# Patient Record
Sex: FEMALE | Race: WHITE | NOT HISPANIC OR LATINO | Employment: OTHER | URBAN - NONMETROPOLITAN AREA
[De-identification: names, ages, dates, MRNs, and addresses within clinical notes are randomized per-mention and may not be internally consistent; named-entity substitution may affect disease eponyms.]

---

## 2023-08-31 ENCOUNTER — OFFICE VISIT (OUTPATIENT)
Dept: PRIMARY CARE | Facility: CLINIC | Age: 63
End: 2023-08-31
Payer: MEDICARE

## 2023-08-31 VITALS
HEART RATE: 73 BPM | DIASTOLIC BLOOD PRESSURE: 82 MMHG | BODY MASS INDEX: 41.99 KG/M2 | SYSTOLIC BLOOD PRESSURE: 128 MMHG | HEIGHT: 63 IN | OXYGEN SATURATION: 97 % | WEIGHT: 237 LBS

## 2023-08-31 DIAGNOSIS — E03.9 ACQUIRED HYPOTHYROIDISM: ICD-10-CM

## 2023-08-31 DIAGNOSIS — E66.01 OBESITY, MORBID (MULTI): ICD-10-CM

## 2023-08-31 DIAGNOSIS — F17.210 CIGARETTE SMOKER: ICD-10-CM

## 2023-08-31 DIAGNOSIS — K21.9 GASTROESOPHAGEAL REFLUX DISEASE WITHOUT ESOPHAGITIS: ICD-10-CM

## 2023-08-31 DIAGNOSIS — E01.0 THYROMEGALY: Primary | ICD-10-CM

## 2023-08-31 DIAGNOSIS — R73.9 HYPERGLYCEMIA: ICD-10-CM

## 2023-08-31 DIAGNOSIS — R53.83 OTHER FATIGUE: ICD-10-CM

## 2023-08-31 DIAGNOSIS — Z13.220 LIPID SCREENING: ICD-10-CM

## 2023-08-31 DIAGNOSIS — E03.9 ACQUIRED HYPOTHYROIDISM: Primary | ICD-10-CM

## 2023-08-31 DIAGNOSIS — Z12.31 ENCOUNTER FOR SCREENING MAMMOGRAM FOR MALIGNANT NEOPLASM OF BREAST: ICD-10-CM

## 2023-08-31 DIAGNOSIS — R49.0 HOARSENESS OF VOICE: ICD-10-CM

## 2023-08-31 DIAGNOSIS — G47.33 OBSTRUCTIVE SLEEP APNEA: ICD-10-CM

## 2023-08-31 DIAGNOSIS — M77.11 LATERAL EPICONDYLITIS OF RIGHT ELBOW: ICD-10-CM

## 2023-08-31 PROCEDURE — 1160F RVW MEDS BY RX/DR IN RCRD: CPT | Performed by: INTERNAL MEDICINE

## 2023-08-31 PROCEDURE — 99204 OFFICE O/P NEW MOD 45 MIN: CPT | Performed by: INTERNAL MEDICINE

## 2023-08-31 PROCEDURE — 1159F MED LIST DOCD IN RCRD: CPT | Performed by: INTERNAL MEDICINE

## 2023-08-31 RX ORDER — LEVOTHYROXINE SODIUM 200 UG/1
200 TABLET ORAL
COMMUNITY
End: 2023-08-31 | Stop reason: SDUPTHER

## 2023-08-31 RX ORDER — LEVOTHYROXINE SODIUM 200 UG/1
200 TABLET ORAL
Qty: 30 TABLET | Refills: 5 | Status: SHIPPED | OUTPATIENT
Start: 2023-08-31 | End: 2023-10-16

## 2023-08-31 RX ORDER — NAPROXEN 500 MG/1
500 TABLET ORAL
Qty: 20 TABLET | Refills: 0 | Status: SHIPPED | OUTPATIENT
Start: 2023-08-31 | End: 2023-09-10

## 2023-08-31 RX ORDER — FAMOTIDINE 20 MG/1
20 TABLET, FILM COATED ORAL 2 TIMES DAILY
Qty: 60 TABLET | Refills: 5 | Status: SHIPPED | OUTPATIENT
Start: 2023-08-31 | End: 2023-12-06

## 2023-08-31 ASSESSMENT — PATIENT HEALTH QUESTIONNAIRE - PHQ9
1. LITTLE INTEREST OR PLEASURE IN DOING THINGS: NOT AT ALL
2. FEELING DOWN, DEPRESSED OR HOPELESS: NOT AT ALL
SUM OF ALL RESPONSES TO PHQ9 QUESTIONS 1 AND 2: 0

## 2023-08-31 ASSESSMENT — ENCOUNTER SYMPTOMS
FATIGUE: 1
COUGH: 1
DIARRHEA: 0
WHEEZING: 0
PALPITATIONS: 0
NAUSEA: 0
BACK PAIN: 0
BLOOD IN STOOL: 0
ABDOMINAL PAIN: 0
ARTHRALGIAS: 0
VOMITING: 0
VOICE CHANGE: 1
CHEST TIGHTNESS: 0
SHORTNESS OF BREATH: 0

## 2023-08-31 NOTE — PATIENT INSTRUCTIONS
As we discussed I have sent 3 prescriptions to your pharmacy.  1 is for your thyroid medication and please get it started right away.  We recommend that when you take your thyroid medication that you take it in isolation.  You should not take it with supplements, food, or other medications.  We typically recommend that you wait 1 hour for doing anything else because food and medications or supplements can bind to the thyroid medication and make it more difficult for your body to absorb the drug  I have decided to wait 8 weeks before checking any lab work because it will take 8 weeks for your thyroid medicine to reach steady state.  It is an 8 weeks that I would like for you to go to the lab fasting because we are checking other labs as well.  I will see you shortly after those labs are done.  Fasting means typically 12 hours if you can.  For example if you decided to go to the lab at 8 AM in the morning then you would simply cut off anything with calories after 8 PM the night before.  You can have water and in fact we want you to drink water so that you are not dehydrated when you get to the lab  We will have you sign for records from [place] and we are particularly interested in getting a copy of your colonoscopy as well as your sleep study  The staff will be helping you to get your screening mammogram scheduled  One of the other medications at the pharmacy is for Naprosyn to be taken twice daily with food for the next 10 days.  Naprosyn is an anti-inflammatory and might help with that tendinitis.  Please also wear a Ace wrap around your elbow and try to rest your elbow for a while.  Please remember that if you need something for additional pain relief do not take any other NSAIDs.  Examples of NSAIDs are ibuprofen, Motrin, Advil, Naprosyn, naproxen, or aspirin.  If you take these medications with the Naprosyn you will be overdosing.  Which you can take for pain safely with the Naprosyn is Tylenol or  "acetaminophen  The staff will be helping you to schedule a CT scan of your lungs.  As we discussed it is recommended that people who have been smoking for over 20 years should be screened once a year for lung cancer.  Please understand that I do not suspect you have lung cancer at this time but the screening test is designed to  a lung cancer early because of we find a early we can cure you.  Unfortunately if we find lung cancer late it is very difficult to cure  I would like to help you quit smoking in the future so please be thinking about that and I agree that trying to quit smoking now during a divorce is not feasible  I also sent a prescription to your pharmacy called famotidine and please take this twice a day to help with your acid reflux.  Your acid reflux could be causing your hoarseness and we want to see if it will go away  If your hoarseness does not go away we probably will need to take a look at your vocal cords to make sure you do not have a growth there is something  When you come back in 2 months we will be doing what we call a \"welcome to Medicare wellness visit \".  This is something promoted by Medicare and it usually is covered.  "

## 2023-08-31 NOTE — ASSESSMENT & PLAN NOTE
-Her thyroid does appear to be a little bit generous in size so I am ordering a thyroid ultrasound

## 2023-08-31 NOTE — ASSESSMENT & PLAN NOTE
-We will try to track down the results of her sleep study  -At the time she was diagnosed she declined  CPAP therapy  -She does complain of excessive fatigue  -I gave her a handout that explains sleep apnea and have asked that she read it a couple of time

## 2023-08-31 NOTE — ASSESSMENT & PLAN NOTE
-She states the hoarseness has been present for just a few days and when she returns we will discuss whether it needs to be evaluated if it is persistent  -She has had some acid reflux which could be the culprit and I will be treating her with famotidine

## 2023-08-31 NOTE — ASSESSMENT & PLAN NOTE
-She has classic features of lateral epicondylitis and the fact that she was painting for prolonged periods of time makes sense  -We will try Naprosyn twice a day for the next 10 days and I have suggested she put an Ace wrap on her elbow.

## 2023-08-31 NOTE — PROGRESS NOTES
Subjective   Patient ID: Linda TARIQ is a 63 y.o. female who presents for No chief complaint on file..  HPI  She is here today to get established and explains that she moved back to [place] from [place] in March.  She was seeing primary care down there but she unfortunately ran out of her thyroid medication about a month ago and is back here to get on track.  She also states she is currently going through a divorce and although stressful she feels like she is holding up okay.  We did review her past medical history.  She states that her prior physician had concerns about her thyroid gland and was proposing that she have an ultrasound.  We will get that ordered.  We also discussed getting caught up on cancer screening and she is due for screening mammogram.  Unfortunately she started smoking at age 18 and she would be a candidate for lung cancer screening.  We will order a CT scan of her lung.  She states she just got onto Medicare and when we see her back we will do a welcome to Medicare wellness visit.  She states that in the past she has been advised that her blood sugar has been elevated so we will be checking for that.  She states she was also diagnosed as having obstructive sleep apnea but she was not sure really what that all meant and she did not get on CPAP therapy.  We will try to get a copy of her report so we can go over together and I am giving her handout today that explains sleep apnea and why it needs to be treated.  She also has been having some soreness in her right forearm at the level of the lateral epicondyles.  It is tender to touch.  She states it all began after painting a bedroom and putting lots of hours doing brush strokes.  I explained to her that I believe she is suffering from lateral epicondylitis and I am going to treat her with an anti-inflammatory for 10 days.  She states she also had a colonoscopy about 5 years ago we will try to get that report.  We will see her back to go over all  the results in 2 months because I want her to be on her thyroid medication for 2 months before we check thyroid blood work.  Review of Systems   Constitutional:  Positive for fatigue.   HENT:  Positive for voice change.    Respiratory:  Positive for cough. Negative for chest tightness, shortness of breath and wheezing.    Cardiovascular:  Negative for chest pain, palpitations and leg swelling.   Gastrointestinal:  Negative for abdominal pain, blood in stool, diarrhea, nausea and vomiting.   Musculoskeletal:  Negative for arthralgias and back pain.     Objective   Physical Exam  Vitals and nursing note reviewed.   Constitutional:       General: She is not in acute distress.     Appearance: Normal appearance.   HENT:      Head: Normocephalic and atraumatic.   Eyes:      Conjunctiva/sclera: Conjunctivae normal.   Cardiovascular:      Rate and Rhythm: Normal rate and regular rhythm.      Heart sounds: Normal heart sounds.   Pulmonary:      Effort: No respiratory distress.      Breath sounds: No wheezing.   Abdominal:      Palpations: Abdomen is soft.      Tenderness: There is no abdominal tenderness. There is no guarding.   Musculoskeletal:         General: No swelling. Normal range of motion.   Skin:     General: Skin is warm and dry.   Neurological:      General: No focal deficit present.      Mental Status: She is alert and oriented to person, place, and time.   Psychiatric:         Behavior: Behavior normal.       Assessment/Plan   Problem List Items Addressed This Visit       Thyromegaly - Primary     -Her thyroid does appear to be a little bit generous in size so I am ordering a thyroid ultrasound         Relevant Orders    US thyroid    Cigarette smoker     -We will try to help her quit smoking  -Because she has had 45 years of smoking I am ordering the low dose CT scan screening for her lungs         Relevant Orders    CT lung screening low dose    Lipid screening     -We will be doing a screening lipid  profile         Relevant Orders    Lipid Panel    Other fatigue    Relevant Orders    CBC and Auto Differential    Comprehensive Metabolic Panel    Hoarseness of voice     -She states the hoarseness has been present for just a few days and when she returns we will discuss whether it needs to be evaluated if it is persistent  -She has had some acid reflux which could be the culprit and I will be treating her with famotidine         Gastroesophageal reflux disease without esophagitis     -We will try famotidine 20 mg twice daily to see if this gets her symptoms under control         Relevant Medications    famotidine (Pepcid) 20 mg tablet    Hyperglycemia     -We will be checking a hemoglobin A1c and fasting glucose         Relevant Orders    Hemoglobin A1C    Lateral epicondylitis of right elbow     -She has classic features of lateral epicondylitis and the fact that she was painting for prolonged periods of time makes sense  -We will try Naprosyn twice a day for the next 10 days and I have suggested she put an Ace wrap on her elbow.         Relevant Medications    naproxen (Naprosyn) 500 mg tablet    Obesity, morbid (CMS/HCC)    Obstructive sleep apnea     -We will try to track down the results of her sleep study  -At the time she was diagnosed she declined  CPAP therapy  -She does complain of excessive fatigue  -I gave her a handout that explains sleep apnea and have asked that she read it a couple of time          Other Visit Diagnoses       Acquired hypothyroidism        Relevant Orders    TSH    T4, free               Alciia Wong, DO

## 2023-10-02 ENCOUNTER — LAB (OUTPATIENT)
Dept: LAB | Facility: LAB | Age: 63
End: 2023-10-02
Payer: MEDICARE

## 2023-10-02 DIAGNOSIS — R73.9 HYPERGLYCEMIA: ICD-10-CM

## 2023-10-02 DIAGNOSIS — Z13.220 LIPID SCREENING: ICD-10-CM

## 2023-10-02 DIAGNOSIS — E03.9 ACQUIRED HYPOTHYROIDISM: ICD-10-CM

## 2023-10-02 DIAGNOSIS — R53.83 OTHER FATIGUE: ICD-10-CM

## 2023-10-02 LAB
ALBUMIN SERPL BCP-MCNC: 3.9 G/DL (ref 3.4–5)
ALP SERPL-CCNC: 93 U/L (ref 33–136)
ALT SERPL W P-5'-P-CCNC: 27 U/L (ref 7–45)
ANION GAP SERPL CALC-SCNC: 9 MMOL/L (ref 10–20)
AST SERPL W P-5'-P-CCNC: 14 U/L (ref 9–39)
BASOPHILS # BLD AUTO: 0.05 X10*3/UL (ref 0–0.1)
BASOPHILS NFR BLD AUTO: 0.6 %
BILIRUB SERPL-MCNC: 0.5 MG/DL (ref 0–1.2)
BUN SERPL-MCNC: 11 MG/DL (ref 6–23)
CALCIUM SERPL-MCNC: 9.6 MG/DL (ref 8.6–10.3)
CHLORIDE SERPL-SCNC: 103 MMOL/L (ref 98–107)
CHOLEST SERPL-MCNC: 157 MG/DL (ref 0–199)
CHOLESTEROL/HDL RATIO: 3.7
CO2 SERPL-SCNC: 30 MMOL/L (ref 21–32)
CREAT SERPL-MCNC: 0.9 MG/DL (ref 0.5–1.05)
EOSINOPHIL # BLD AUTO: 0.31 X10*3/UL (ref 0–0.7)
EOSINOPHIL NFR BLD AUTO: 3.9 %
ERYTHROCYTE [DISTWIDTH] IN BLOOD BY AUTOMATED COUNT: 13.7 % (ref 11.5–14.5)
EST. AVERAGE GLUCOSE BLD GHB EST-MCNC: 120 MG/DL
GFR SERPL CREATININE-BSD FRML MDRD: 71 ML/MIN/1.73M*2
GLUCOSE SERPL-MCNC: 101 MG/DL (ref 74–99)
HBA1C MFR BLD: 5.8 %
HCT VFR BLD AUTO: 42.4 % (ref 36–46)
HDLC SERPL-MCNC: 43 MG/DL
HGB BLD-MCNC: 13.7 G/DL (ref 12–16)
IMM GRANULOCYTES # BLD AUTO: 0.02 X10*3/UL (ref 0–0.7)
IMM GRANULOCYTES NFR BLD AUTO: 0.2 % (ref 0–0.9)
LDLC SERPL CALC-MCNC: 87 MG/DL (ref 140–190)
LYMPHOCYTES # BLD AUTO: 2.53 X10*3/UL (ref 1.2–4.8)
LYMPHOCYTES NFR BLD AUTO: 31.5 %
MCH RBC QN AUTO: 32.2 PG (ref 26–34)
MCHC RBC AUTO-ENTMCNC: 32.3 G/DL (ref 32–36)
MCV RBC AUTO: 100 FL (ref 80–100)
MONOCYTES # BLD AUTO: 0.59 X10*3/UL (ref 0.1–1)
MONOCYTES NFR BLD AUTO: 7.3 %
NEUTROPHILS # BLD AUTO: 4.53 X10*3/UL (ref 1.2–7.7)
NEUTROPHILS NFR BLD AUTO: 56.5 %
NON HDL CHOLESTEROL: 114 MG/DL (ref 0–149)
NRBC BLD-RTO: 0 /100 WBCS (ref 0–0)
PLATELET # BLD AUTO: 266 X10*3/UL (ref 150–450)
PMV BLD AUTO: 10.8 FL (ref 7.5–11.5)
POTASSIUM SERPL-SCNC: 4.9 MMOL/L (ref 3.5–5.3)
PROT SERPL-MCNC: 6.1 G/DL (ref 6.4–8.2)
RBC # BLD AUTO: 4.26 X10*6/UL (ref 4–5.2)
SODIUM SERPL-SCNC: 137 MMOL/L (ref 136–145)
T4 FREE SERPL-MCNC: 1.65 NG/DL (ref 0.61–1.12)
TRIGL SERPL-MCNC: 133 MG/DL (ref 0–149)
TSH SERPL-ACNC: 0.22 MIU/L (ref 0.44–3.98)
VLDL: 27 MG/DL (ref 0–40)
WBC # BLD AUTO: 8 X10*3/UL (ref 4.4–11.3)

## 2023-10-02 PROCEDURE — 36415 COLL VENOUS BLD VENIPUNCTURE: CPT

## 2023-10-09 ENCOUNTER — APPOINTMENT (OUTPATIENT)
Dept: RADIOLOGY | Facility: HOSPITAL | Age: 63
End: 2023-10-09
Payer: MEDICARE

## 2023-10-11 ENCOUNTER — HOSPITAL ENCOUNTER (OUTPATIENT)
Dept: RADIOLOGY | Facility: HOSPITAL | Age: 63
Discharge: HOME | End: 2023-10-11
Payer: MEDICARE

## 2023-10-11 DIAGNOSIS — E01.0 IODINE-DEFICIENCY RELATED DIFFUSE (ENDEMIC) GOITER: ICD-10-CM

## 2023-10-11 PROCEDURE — 76536 US EXAM OF HEAD AND NECK: CPT

## 2023-10-11 PROCEDURE — 76536 US EXAM OF HEAD AND NECK: CPT | Performed by: RADIOLOGY

## 2023-10-16 DIAGNOSIS — E03.9 ACQUIRED HYPOTHYROIDISM: Primary | ICD-10-CM

## 2023-10-16 RX ORDER — LEVOTHYROXINE SODIUM 175 UG/1
175 TABLET ORAL DAILY
Qty: 30 TABLET | Refills: 5 | Status: SHIPPED | OUTPATIENT
Start: 2023-10-16 | End: 2024-03-28

## 2023-10-26 ENCOUNTER — ANCILLARY PROCEDURE (OUTPATIENT)
Dept: RADIOLOGY | Facility: CLINIC | Age: 63
End: 2023-10-26
Payer: MEDICARE

## 2023-10-26 ENCOUNTER — OFFICE VISIT (OUTPATIENT)
Dept: PRIMARY CARE | Facility: CLINIC | Age: 63
End: 2023-10-26
Payer: MEDICARE

## 2023-10-26 VITALS
HEART RATE: 76 BPM | HEIGHT: 63 IN | WEIGHT: 230 LBS | BODY MASS INDEX: 40.75 KG/M2 | SYSTOLIC BLOOD PRESSURE: 114 MMHG | DIASTOLIC BLOOD PRESSURE: 74 MMHG

## 2023-10-26 DIAGNOSIS — M77.11 LATERAL EPICONDYLITIS OF RIGHT ELBOW: ICD-10-CM

## 2023-10-26 DIAGNOSIS — Z00.00 WELCOME TO MEDICARE PREVENTIVE VISIT: Primary | ICD-10-CM

## 2023-10-26 DIAGNOSIS — E04.1 THYROID NODULE: ICD-10-CM

## 2023-10-26 DIAGNOSIS — R49.0 HOARSENESS OF VOICE: ICD-10-CM

## 2023-10-26 DIAGNOSIS — R73.9 HYPERGLYCEMIA: ICD-10-CM

## 2023-10-26 DIAGNOSIS — Z78.0 MENOPAUSE: ICD-10-CM

## 2023-10-26 PROBLEM — E01.0 THYROMEGALY: Status: RESOLVED | Noted: 2023-08-31 | Resolved: 2023-10-26

## 2023-10-26 PROCEDURE — 73070 X-RAY EXAM OF ELBOW: CPT | Mod: RT,FY

## 2023-10-26 PROCEDURE — G0009 ADMIN PNEUMOCOCCAL VACCINE: HCPCS | Performed by: INTERNAL MEDICINE

## 2023-10-26 PROCEDURE — 1159F MED LIST DOCD IN RCRD: CPT | Performed by: INTERNAL MEDICINE

## 2023-10-26 PROCEDURE — 73070 X-RAY EXAM OF ELBOW: CPT | Mod: RIGHT SIDE | Performed by: RADIOLOGY

## 2023-10-26 PROCEDURE — 1170F FXNL STATUS ASSESSED: CPT | Performed by: INTERNAL MEDICINE

## 2023-10-26 PROCEDURE — 90677 PCV20 VACCINE IM: CPT | Performed by: INTERNAL MEDICINE

## 2023-10-26 PROCEDURE — 1160F RVW MEDS BY RX/DR IN RCRD: CPT | Performed by: INTERNAL MEDICINE

## 2023-10-26 PROCEDURE — G0439 PPPS, SUBSEQ VISIT: HCPCS | Performed by: INTERNAL MEDICINE

## 2023-10-26 PROCEDURE — 99214 OFFICE O/P EST MOD 30 MIN: CPT | Performed by: INTERNAL MEDICINE

## 2023-10-26 ASSESSMENT — ENCOUNTER SYMPTOMS
DIARRHEA: 0
WHEEZING: 0
ARTHRALGIAS: 0
PALPITATIONS: 0
BACK PAIN: 0
SHORTNESS OF BREATH: 0
ABDOMINAL PAIN: 0
VOMITING: 0
COUGH: 0
FATIGUE: 0
NAUSEA: 0
BLOOD IN STOOL: 0

## 2023-10-26 ASSESSMENT — ACTIVITIES OF DAILY LIVING (ADL)
DOING_HOUSEWORK: INDEPENDENT
GROCERY_SHOPPING: INDEPENDENT
BATHING: INDEPENDENT
TAKING_MEDICATION: INDEPENDENT
MANAGING_FINANCES: INDEPENDENT
DRESSING: INDEPENDENT

## 2023-10-26 ASSESSMENT — PATIENT HEALTH QUESTIONNAIRE - PHQ9
SUM OF ALL RESPONSES TO PHQ9 QUESTIONS 1 AND 2: 0
2. FEELING DOWN, DEPRESSED OR HOPELESS: NOT AT ALL
SUM OF ALL RESPONSES TO PHQ9 QUESTIONS 1 AND 2: 0
2. FEELING DOWN, DEPRESSED OR HOPELESS: NOT AT ALL
1. LITTLE INTEREST OR PLEASURE IN DOING THINGS: NOT AT ALL
1. LITTLE INTEREST OR PLEASURE IN DOING THINGS: NOT AT ALL

## 2023-10-26 NOTE — ASSESSMENT & PLAN NOTE
-We talked about fall prevention  -We discussed providing a copy of her living will and power of  for healthcare

## 2023-10-26 NOTE — PROGRESS NOTES
Subjective   Reason for Visit: Linda Lynn is an 63 y.o. female here for a Medicare Wellness visit.     Past Medical, Surgical, and Family History reviewed and updated in chart.    Reviewed all medications by prescribing practitioner or clinical pharmacist (such as prescriptions, OTCs, herbal therapies and supplements) and documented in the medical record.    HPI  She is here today for follow-up and she just became a Medicare recipient.  We did perform her welcome to Medicare wellness visit today.  We also reviewed her test results and laboratory test results as well.  She states she is under a lot of stress because she is going through a divorce but she states she is not really feeling depressed.  She has had no falls in the last year.  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  She has had no issues with her memory and for the most part her diet remains relatively well-balanced.  She states she was in a weight loss challenge with her daughter and so far she is winning.  She is also very physically active and gets a lot of steps in every day.  We discussed the importance of advanced directives as that she provide a copy for her chart here.  We also reviewed her laboratory test results.  Her fasting glucose was slightly raised at 101 and her hemoglobin A1c is slightly high at 5.8.  She understands with healthy diet and exercise and weight loss she can dramatically reduce her risk of diabetes in the future.  She is also been experiencing hoarseness and we did end up ordering an ultrasound of her thyroid gland.  The radiologist describes seeing a nodule measuring 10 x 8 x 5 mm in the right thyroid gland and labeled as a TR 4.  We also discovered that her thyroid blood work was off so I have issued a referral to endocrinology and have asked that she call the office if she does not hear from us.  I am also going to plug in an ultrasound for 1 year from now just to  "make sure we do not miss anything.  We also discussed doing a bone density just prior to her next follow-up visit.  We also discussed having her see ENT because of this hoarseness.  We talked about how acid reflux or postnasal drip could cause irritation to the vocal cords but because she is a smoker we need to make sure she does not have something more serious.  We also are giving her Prevnar 20 today.  She continues to have a lot of problems with her right elbow and I did diagnosed her as having tennis elbow last time.  We gave her Naprosyn but did not really help.  We are going to do an x-ray and I told her we may have to resort to either physical therapy or referral to orthopedics as they can provide a cortisone injection which can be quite beneficial.  I will try to summarize everything in a problem based format  Patient Care Team:  Alicia Wong, DO as PCP - General (Internal Medicine)     Review of Systems   Constitutional:  Negative for fatigue.   Respiratory:  Negative for cough, shortness of breath and wheezing.    Cardiovascular:  Negative for chest pain, palpitations and leg swelling.   Gastrointestinal:  Negative for abdominal pain, blood in stool, diarrhea, nausea and vomiting.   Musculoskeletal:  Negative for arthralgias and back pain.       Objective   Vitals:  /74 (BP Location: Left arm, Patient Position: Sitting)   Pulse 76   Ht 1.6 m (5' 3\")   Wt 104 kg (230 lb)   BMI 40.74 kg/m²       Physical Exam  Vitals and nursing note reviewed.   Constitutional:       General: She is not in acute distress.     Appearance: Normal appearance.   HENT:      Head: Normocephalic and atraumatic.   Eyes:      Conjunctiva/sclera: Conjunctivae normal.   Cardiovascular:      Rate and Rhythm: Normal rate and regular rhythm.      Heart sounds: Normal heart sounds.   Pulmonary:      Effort: No respiratory distress.      Breath sounds: No wheezing.   Abdominal:      Palpations: Abdomen is soft.      " Tenderness: There is no abdominal tenderness. There is no guarding.   Musculoskeletal:         General: No swelling. Normal range of motion.   Skin:     General: Skin is warm and dry.   Neurological:      General: No focal deficit present.      Mental Status: She is alert and oriented to person, place, and time.   Psychiatric:         Behavior: Behavior normal.       Recent Results (from the past 672 hour(s))   TSH    Collection Time: 10/02/23  8:08 AM   Result Value Ref Range    Thyroid Stimulating Hormone 0.22 (L) 0.44 - 3.98 mIU/L   T4, free    Collection Time: 10/02/23  8:08 AM   Result Value Ref Range    Thyroxine, Free 1.65 (H) 0.61 - 1.12 ng/dL   Lipid Panel    Collection Time: 10/02/23  8:08 AM   Result Value Ref Range    Cholesterol 157 0 - 199 mg/dL    HDL-Cholesterol 43.0 mg/dL    Cholesterol/HDL Ratio 3.7     LDL Calculated 87 (L) 140 - 190 mg/dL    VLDL 27 0 - 40 mg/dL    Triglycerides 133 0 - 149 mg/dL    Non HDL Cholesterol 114 0 - 149 mg/dL   CBC and Auto Differential    Collection Time: 10/02/23  8:08 AM   Result Value Ref Range    WBC 8.0 4.4 - 11.3 x10*3/uL    nRBC 0.0 0.0 - 0.0 /100 WBCs    RBC 4.26 4.00 - 5.20 x10*6/uL    Hemoglobin 13.7 12.0 - 16.0 g/dL    Hematocrit 42.4 36.0 - 46.0 %     80 - 100 fL    MCH 32.2 26.0 - 34.0 pg    MCHC 32.3 32.0 - 36.0 g/dL    RDW 13.7 11.5 - 14.5 %    Platelets 266 150 - 450 x10*3/uL    MPV 10.8 7.5 - 11.5 fL    Neutrophils % 56.5 40.0 - 80.0 %    Immature Granulocytes %, Automated 0.2 0.0 - 0.9 %    Lymphocytes % 31.5 13.0 - 44.0 %    Monocytes % 7.3 2.0 - 10.0 %    Eosinophils % 3.9 0.0 - 6.0 %    Basophils % 0.6 0.0 - 2.0 %    Neutrophils Absolute 4.53 1.20 - 7.70 x10*3/uL    Immature Granulocytes Absolute, Automated 0.02 0.00 - 0.70 x10*3/uL    Lymphocytes Absolute 2.53 1.20 - 4.80 x10*3/uL    Monocytes Absolute 0.59 0.10 - 1.00 x10*3/uL    Eosinophils Absolute 0.31 0.00 - 0.70 x10*3/uL    Basophils Absolute 0.05 0.00 - 0.10 x10*3/uL   Comprehensive  Metabolic Panel    Collection Time: 10/02/23  8:08 AM   Result Value Ref Range    Glucose 101 (H) 74 - 99 mg/dL    Sodium 137 136 - 145 mmol/L    Potassium 4.9 3.5 - 5.3 mmol/L    Chloride 103 98 - 107 mmol/L    Bicarbonate 30 21 - 32 mmol/L    Anion Gap 9 (L) 10 - 20 mmol/L    Urea Nitrogen 11 6 - 23 mg/dL    Creatinine 0.90 0.50 - 1.05 mg/dL    eGFR 71 >60 mL/min/1.73m*2    Calcium 9.6 8.6 - 10.3 mg/dL    Albumin 3.9 3.4 - 5.0 g/dL    Alkaline Phosphatase 93 33 - 136 U/L    Total Protein 6.1 (L) 6.4 - 8.2 g/dL    AST 14 9 - 39 U/L    Bilirubin, Total 0.5 0.0 - 1.2 mg/dL    ALT 27 7 - 45 U/L   Hemoglobin A1C    Collection Time: 10/02/23  8:08 AM   Result Value Ref Range    Hemoglobin A1C 5.8 (H) see below %    Estimated Average Glucose 120 Not Established mg/dL     .    Assessment/Plan   Problem List Items Addressed This Visit       Welcome to Medicare preventive visit - Primary     -We talked about fall prevention  -We discussed providing a copy of her living will and power of  for healthcare         Hoarseness of voice     -We are referring her to ENT for evaluation         Relevant Orders    Referral to ENT    Hyperglycemia     -Hemoglobin A1c was 5.8 and we will continue to monitor         Relevant Orders    Basic Metabolic Panel    Hemoglobin A1C    Lateral epicondylitis of right elbow     -We have tried a round of Naprosyn  -She will go for an x-ray and have agreed to contact her with results         Relevant Orders    XR elbow right 1-2 views    Thyroid nodule     -Her right thyroid lobe has a TR 4 nodule and she also has abnormal thyroid blood work.  -We are referring her to endocrinology and she will call if she does not hear from us  -I did plug-in an ultrasound to be done 1 year from now         Relevant Orders    US thyroid    Menopause    Relevant Orders    XR DEXA bone density

## 2023-10-26 NOTE — ASSESSMENT & PLAN NOTE
-We have tried a round of Naprosyn  -She will go for an x-ray and have agreed to contact her with results

## 2023-10-26 NOTE — ASSESSMENT & PLAN NOTE
-Her right thyroid lobe has a TR 4 nodule and she also has abnormal thyroid blood work.  -We are referring her to endocrinology and she will call if she does not hear from us  -I did plug-in an ultrasound to be done 1 year from now

## 2023-10-26 NOTE — PATIENT INSTRUCTIONS
Please contact me if you do not hear from us regarding your referral to endocrinology  They are going to have you see ear nose and throat because of your persistent hoarseness  As we discussed at some point in the future we can really focus on quitting smoking but we will wait until after the stress of your divorce is over  When you think of it try to provide a copy of your living will and power of  for healthcare  We are giving you Prevnar 20 vaccine today  I am having you go for an x-ray of your right below and have agreed to contact you with results.  Remember that you might need to have physical therapy or seeing orthopedic specialist  If everything goes according to plan we will see you back in 6 months and please remember to get fasting lab work done prior to that visit

## 2023-11-06 DIAGNOSIS — M77.11 LATERAL EPICONDYLITIS OF RIGHT ELBOW: Primary | ICD-10-CM

## 2023-11-09 ENCOUNTER — OFFICE VISIT (OUTPATIENT)
Dept: ORTHOPEDIC SURGERY | Facility: CLINIC | Age: 63
End: 2023-11-09
Payer: MEDICARE

## 2023-11-09 DIAGNOSIS — M77.11 LATERAL EPICONDYLITIS OF RIGHT ELBOW: ICD-10-CM

## 2023-11-09 PROCEDURE — 20606 DRAIN/INJ JOINT/BURSA W/US: CPT | Performed by: NURSE PRACTITIONER

## 2023-11-09 PROCEDURE — 1160F RVW MEDS BY RX/DR IN RCRD: CPT | Performed by: NURSE PRACTITIONER

## 2023-11-09 PROCEDURE — L3908 WHO COCK-UP NONMOLDE PRE OTS: HCPCS | Performed by: NURSE PRACTITIONER

## 2023-11-09 PROCEDURE — 1159F MED LIST DOCD IN RCRD: CPT | Performed by: NURSE PRACTITIONER

## 2023-11-09 PROCEDURE — 99204 OFFICE O/P NEW MOD 45 MIN: CPT | Performed by: NURSE PRACTITIONER

## 2023-11-09 PROCEDURE — 1125F AMNT PAIN NOTED PAIN PRSNT: CPT | Performed by: NURSE PRACTITIONER

## 2023-11-09 ASSESSMENT — ENCOUNTER SYMPTOMS
CARDIOVASCULAR NEGATIVE: 1
NEUROLOGICAL NEGATIVE: 1
ENDOCRINE NEGATIVE: 1
HEMATOLOGIC/LYMPHATIC NEGATIVE: 1
ARTHRALGIAS: 1
CONSTITUTIONAL NEGATIVE: 1
PSYCHIATRIC NEGATIVE: 1
RESPIRATORY NEGATIVE: 1

## 2023-11-09 ASSESSMENT — PAIN SCALES - GENERAL: PAINLEVEL_OUTOF10: 8

## 2023-11-09 ASSESSMENT — PAIN - FUNCTIONAL ASSESSMENT: PAIN_FUNCTIONAL_ASSESSMENT: 0-10

## 2023-11-09 NOTE — ASSESSMENT & PLAN NOTE
Sx control with OTC NSAIDs per package directions, take with food, Tylenol prn.   Tennis elbow bracing with repetitive or aggravating activities, may remove with rest and sleep.  Also recommended Velcro wrist brace to limit  Lifting restriction recommended: No greater than 5 pounds for 1 week.  Follow up here 4 weeks, sooner for changes or concerns.    Patient tolerated injection today with positive lidocaine suppression at time of DC.  I offered work note, patient will speak with her employer and see if she can modify work to avoid aggravation.  Patient does have the month of December requested off.  This note was generated using Dragon software.  It may contain errors in wording, punctuation or spelling.

## 2023-11-09 NOTE — PROGRESS NOTES
Subjective    Patient ID: Linda Lynn is a 63 y.o. female.    Chief Complaint: Pain of the Right Elbow (Patient states she has had this pain for about 3 months)     HPI  Linda is a pleasant 63-year-old female presenting today for evaluation of right elbow pain.  Patient has been having pain for approximately 3 months after painting a room.  Tried antiinflammaotires, OTC Icy Hot and pain patches, ice, heat. Ace at night, OTC wrap prn.h  Sx slightly better with rest.   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 Sx started after painting rooms.  RH dominant    Review of Systems   Constitutional: Negative.    HENT: Negative.     Respiratory: Negative.     Cardiovascular: Negative.    Endocrine: Negative.    Musculoskeletal:  Positive for arthralgias.   Skin: Negative.    Neurological: Negative.    Hematological: Negative.    Psychiatric/Behavioral: Negative.        Objective   Right Elbow Exam     Tenderness   The patient is experiencing tenderness in the lateral epicondyle.     Range of Motion   Extension:  10   Flexion:  90     Other   Erythema: absent  Sensation: normal  Pulse: present    Comments:  There is mild swelling over the lateral epicondyles, negative Tinel's over the cubital tunnel.  Full ROM of distal joints with no sx aggravation, distal motor and sensory intact, cap refill at 2 seconds.          Image Results:  XR elbow right 1-2 views  Narrative: Interpreted By:  Cassy Coley,   STUDY:  Right elbow, 2 views.      INDICATION:  Signs/Symptoms:Persistent pain in the elbow region.      COMPARISON:  None.      ACCESSION NUMBER(S):  [id number]      ORDERING CLINICIAN:  PAM HOPE      FINDINGS:  No acute fracture or malalignment.  No elbow joint effusion or abnormal fat pad elevation.  No significant degenerative changes.  Soft tissues are unremarkable.      Impression: 1. Unremarkable radiographic evaluation of the right elbow.      MACRO:  None.      Signed by:  Cassy Coley 10/28/2023 9:00 AM  Dictation workstation:   [id number]  Reviewed x-rays during today's visit with patient, agree with radiology interpretation.      M Inj/Asp: R elbow on 11/9/2023 8:23 AM  Indications: pain and joint swelling  Details: 25 G needle, ultrasound-guided lateral approach  Medications: 4 mg dexAMETHasone 4 mg/mL  Outcome: tolerated well, no immediate complications    We discussed risk and benefits of cortisone injection, patient wishes to proceed via verbal consent.  Skin was prepped with Betadine, Vapocoolant spray and alcohol.  Direct visualization using high-frequency linear probe of ultrasound was utilized to administer the injection of 4mg Dexamethasone, 1cc Lidocaine.  Patient tolerated injection well lidocaine suppression.  Images were saved under MRN number into the PACS system.      Consent was given by the patient. Immediately prior to procedure a time out was called to verify the correct patient, procedure, equipment, support staff and site/side marked as required. Patient was prepped and draped in the usual sterile fashion.         Assessment/Plan   Encounter Diagnoses:  Lateral epicondylitis of right elbow  Problem List Items Addressed This Visit             ICD-10-CM    Lateral epicondylitis of right elbow M77.11     Sx control with OTC NSAIDs per package directions, take with food, Tylenol prn.   Tennis elbow bracing with repetitive or aggravating activities, may remove with rest and sleep.  Also recommended Velcro wrist brace to limit  Lifting restriction recommended: No greater than 5 pounds for 1 week.  Follow up here 4 weeks, sooner for changes or concerns.    Patient tolerated injection today with positive lidocaine suppression at time of DC.  I offered work note, patient will speak with her employer and see if she can modify work to avoid aggravation.  Patient does have the month of December requested off.  This note was generated using Dragon software.  It may  contain errors in wording, punctuation or spelling.         Relevant Orders    Follow Up In Orthopaedic Surgery    Point of Care Ultrasound (Completed)

## 2023-11-14 DIAGNOSIS — M77.11 LATERAL EPICONDYLITIS OF RIGHT ELBOW: Primary | ICD-10-CM

## 2023-11-14 NOTE — PROGRESS NOTES
Encounter Diagnosis   Name Primary?    Lateral epicondylitis of right elbow Yes    Lateral epicondylitis of right elbow  No use R arm until follow up appt. Off work or accommodations to avoid use of R arm.

## 2023-12-06 ENCOUNTER — OFFICE VISIT (OUTPATIENT)
Dept: ORTHOPEDIC SURGERY | Facility: CLINIC | Age: 63
End: 2023-12-06
Payer: MEDICARE

## 2023-12-06 DIAGNOSIS — M77.11 LATERAL EPICONDYLITIS OF RIGHT ELBOW: ICD-10-CM

## 2023-12-06 PROCEDURE — 1160F RVW MEDS BY RX/DR IN RCRD: CPT | Performed by: NURSE PRACTITIONER

## 2023-12-06 PROCEDURE — 1125F AMNT PAIN NOTED PAIN PRSNT: CPT | Performed by: NURSE PRACTITIONER

## 2023-12-06 PROCEDURE — 99213 OFFICE O/P EST LOW 20 MIN: CPT | Performed by: NURSE PRACTITIONER

## 2023-12-06 PROCEDURE — 1159F MED LIST DOCD IN RCRD: CPT | Performed by: NURSE PRACTITIONER

## 2023-12-06 RX ORDER — FLUTICASONE PROPIONATE 50 MCG
2 SPRAY, SUSPENSION (ML) NASAL
COMMUNITY
Start: 2023-11-13 | End: 2024-11-12

## 2023-12-06 RX ORDER — DICLOFENAC SODIUM 10 MG/G
2 GEL TOPICAL 4 TIMES DAILY PRN
Qty: 100 G | Refills: 1 | Status: SHIPPED | OUTPATIENT
Start: 2023-12-06

## 2023-12-06 RX ORDER — FAMOTIDINE 40 MG/1
40 TABLET, FILM COATED ORAL DAILY
COMMUNITY
Start: 2023-12-04

## 2023-12-06 ASSESSMENT — ENCOUNTER SYMPTOMS
ENDOCRINE NEGATIVE: 1
CONSTITUTIONAL NEGATIVE: 1
CARDIOVASCULAR NEGATIVE: 1
RESPIRATORY NEGATIVE: 1
PSYCHIATRIC NEGATIVE: 1
HEMATOLOGIC/LYMPHATIC NEGATIVE: 1
ARTHRALGIAS: 1
NEUROLOGICAL NEGATIVE: 1

## 2023-12-06 ASSESSMENT — PAIN DESCRIPTION - DESCRIPTORS: DESCRIPTORS: DULL;ACHING

## 2023-12-06 ASSESSMENT — PAIN SCALES - GENERAL: PAINLEVEL_OUTOF10: 6

## 2023-12-06 ASSESSMENT — PAIN - FUNCTIONAL ASSESSMENT: PAIN_FUNCTIONAL_ASSESSMENT: 0-10

## 2023-12-06 NOTE — PROGRESS NOTES
Subjective    Patient ID: Linda Lynn is a 63 y.o. female.    Chief Complaint: Follow-up and Pain of the Right Elbow (Patient states she still has pain in her elbow, she does wear the wrist brace as directed.)     YOMAIRA Mckeon is a pleasant 63-year-old female presenting today for FUV of right elbow pain, and lateral epicondylitis.    Sx slightly better with rest. Steroid injection helped, wearing wrist brace helps.  Symptoms are aggravated with heavy lifting and reaching activities.  Did not obtain tennis elbow strap yet.  Quit her job as unable to accommodate time off to allow rest  Ibuprofen helps  RH dominant      Review of Systems   Constitutional: Negative.    HENT: Negative.     Respiratory: Negative.     Cardiovascular: Negative.    Endocrine: Negative.    Musculoskeletal:  Positive for arthralgias.   Skin: Negative.    Neurological: Negative.    Hematological: Negative.    Psychiatric/Behavioral: Negative.        Objective   Right Elbow Exam     Tenderness   The patient is experiencing tenderness in the lateral epicondyle.     Range of Motion   Extension:  10   Flexion:  90     Other   Erythema: absent  Sensation: normal  Pulse: present    Comments:  There is mild swelling over the lateral epicondyles, negative Tinel's over the cubital tunnel.  Full ROM of distal joints with no sx aggravation, distal motor and sensory intact, cap refill at 2 seconds.        Image Results:  XR elbow right 1-2 views  Narrative: Interpreted By:  Cassy Coley,   STUDY:  Right elbow, 2 views.      INDICATION:  Signs/Symptoms:Persistent pain in the elbow region.      COMPARISON:  None.      ACCESSION NUMBER(S):  [id number]      ORDERING CLINICIAN:  PAM HOPE      FINDINGS:  No acute fracture or malalignment.  No elbow joint effusion or abnormal fat pad elevation.  No significant degenerative changes.  Soft tissues are unremarkable.      Impression: 1. Unremarkable radiographic evaluation of the right elbow.       MACRO:  None.      Signed by: Cassy Coley 10/28/2023 9:00 AM  Dictation workstation:   [id number]  Reviewed x-rays during today's visit with patient, agree with radiology interpretation.      Procedures  Assessment/Plan   Encounter Diagnoses:  Lateral epicondylitis of right elbow  Problem List Items Addressed This Visit             ICD-10-CM    Lateral epicondylitis of right elbow M77.11     Problem List Items Addressed This Visit             ICD-10-CM    Lateral epicondylitis of right elbow M77.11       Sx control with OTC NSAIDs per package directions, take with food, Tylenol prn. Recommended topical diclofenac gel 2 g 4 times daily and massage into the area over the next several days, may back down to as needed basis according to symptoms.  Tennis elbow bracing recommended with repetitive or aggravating activities, may remove with rest and sleep.  Also recommended Velcro wrist brace to be used with the tennis elbow strapping for symptom flares.  May transition to just a tennis elbow strap.  Lifting restriction recommended: Patient encouraged to keep activities and waist level, keep weight light and gradually increase as tolerated.  I am recommending OT for additional modalities for symptom control.  Follow up here 4 weeks, sooner for changes or concerns.    This note was generated using Dragon software.  It may contain errors in wording, punctuation or spelling.          Relevant Medications    diclofenac sodium (Voltaren) 1 % gel gel    Other Relevant Orders    Referral to Occupational Therapy    Follow Up In Orthopaedic Surgery

## 2023-12-06 NOTE — ASSESSMENT & PLAN NOTE
Sx control with OTC NSAIDs per package directions, take with food, Tylenol prn. Recommended topical diclofenac gel 2 g 4 times daily and massage into the area over the next several days, may back down to as needed basis according to symptoms.  Tennis elbow bracing recommended with repetitive or aggravating activities, may remove with rest and sleep.  Also recommended Velcro wrist brace to be used with the tennis elbow strapping for symptom flares.  May transition to just a tennis elbow strap.  Lifting restriction recommended: Patient encouraged to keep activities and waist level, keep weight light and gradually increase as tolerated.  I am recommending OT for additional modalities for symptom control.  Follow up here 4 weeks, sooner for changes or concerns.    This note was generated using Dragon software.  It may contain errors in wording, punctuation or spelling.

## 2024-01-05 ENCOUNTER — APPOINTMENT (OUTPATIENT)
Dept: ORTHOPEDIC SURGERY | Facility: CLINIC | Age: 64
End: 2024-01-05
Payer: MEDICARE

## 2024-03-28 DIAGNOSIS — E03.9 ACQUIRED HYPOTHYROIDISM: ICD-10-CM

## 2024-03-28 RX ORDER — LEVOTHYROXINE SODIUM 175 UG/1
175 TABLET ORAL DAILY
Qty: 90 TABLET | Refills: 0 | Status: SHIPPED | OUTPATIENT
Start: 2024-03-28 | End: 2024-04-25

## 2024-04-15 ENCOUNTER — LAB (OUTPATIENT)
Dept: LAB | Facility: LAB | Age: 64
End: 2024-04-15
Payer: MEDICARE

## 2024-04-15 DIAGNOSIS — E03.9 ACQUIRED HYPOTHYROIDISM: ICD-10-CM

## 2024-04-15 DIAGNOSIS — R73.9 HYPERGLYCEMIA: ICD-10-CM

## 2024-04-15 LAB
ANION GAP SERPL CALC-SCNC: 9 MMOL/L (ref 10–20)
BUN SERPL-MCNC: 12 MG/DL (ref 6–23)
CALCIUM SERPL-MCNC: 9.7 MG/DL (ref 8.6–10.3)
CHLORIDE SERPL-SCNC: 105 MMOL/L (ref 98–107)
CO2 SERPL-SCNC: 30 MMOL/L (ref 21–32)
CREAT SERPL-MCNC: 1 MG/DL (ref 0.5–1.05)
EGFRCR SERPLBLD CKD-EPI 2021: 63 ML/MIN/1.73M*2
EST. AVERAGE GLUCOSE BLD GHB EST-MCNC: 111 MG/DL
GLUCOSE SERPL-MCNC: 100 MG/DL (ref 74–99)
HBA1C MFR BLD: 5.5 %
POTASSIUM SERPL-SCNC: 4.7 MMOL/L (ref 3.5–5.3)
SODIUM SERPL-SCNC: 139 MMOL/L (ref 136–145)
T4 FREE SERPL-MCNC: 1.3 NG/DL (ref 0.61–1.12)
TSH SERPL-ACNC: 2.12 MIU/L (ref 0.44–3.98)

## 2024-04-15 PROCEDURE — 36415 COLL VENOUS BLD VENIPUNCTURE: CPT

## 2024-04-15 PROCEDURE — 83036 HEMOGLOBIN GLYCOSYLATED A1C: CPT

## 2024-04-15 PROCEDURE — 84443 ASSAY THYROID STIM HORMONE: CPT

## 2024-04-15 PROCEDURE — 84439 ASSAY OF FREE THYROXINE: CPT

## 2024-04-15 PROCEDURE — 80048 BASIC METABOLIC PNL TOTAL CA: CPT

## 2024-04-19 ENCOUNTER — APPOINTMENT (OUTPATIENT)
Dept: RADIOLOGY | Facility: CLINIC | Age: 64
End: 2024-04-19
Payer: MEDICARE

## 2024-04-25 ENCOUNTER — OFFICE VISIT (OUTPATIENT)
Dept: PRIMARY CARE | Facility: CLINIC | Age: 64
End: 2024-04-25
Payer: MEDICARE

## 2024-04-25 VITALS
BODY MASS INDEX: 42.52 KG/M2 | SYSTOLIC BLOOD PRESSURE: 124 MMHG | HEART RATE: 72 BPM | OXYGEN SATURATION: 98 % | DIASTOLIC BLOOD PRESSURE: 72 MMHG | HEIGHT: 63 IN | WEIGHT: 240 LBS

## 2024-04-25 DIAGNOSIS — M77.11 LATERAL EPICONDYLITIS OF RIGHT ELBOW: ICD-10-CM

## 2024-04-25 DIAGNOSIS — R49.0 HOARSENESS OF VOICE: ICD-10-CM

## 2024-04-25 DIAGNOSIS — F17.210 CIGARETTE SMOKER: Primary | ICD-10-CM

## 2024-04-25 DIAGNOSIS — E03.9 ACQUIRED HYPOTHYROIDISM: ICD-10-CM

## 2024-04-25 DIAGNOSIS — E66.01 OBESITY, MORBID (MULTI): ICD-10-CM

## 2024-04-25 DIAGNOSIS — K21.9 GASTROESOPHAGEAL REFLUX DISEASE WITHOUT ESOPHAGITIS: ICD-10-CM

## 2024-04-25 DIAGNOSIS — G47.33 OBSTRUCTIVE SLEEP APNEA: ICD-10-CM

## 2024-04-25 DIAGNOSIS — Z78.0 MENOPAUSE: ICD-10-CM

## 2024-04-25 PROBLEM — Z00.00 WELCOME TO MEDICARE PREVENTIVE VISIT: Status: RESOLVED | Noted: 2023-08-31 | Resolved: 2024-04-25

## 2024-04-25 PROCEDURE — 1159F MED LIST DOCD IN RCRD: CPT | Performed by: INTERNAL MEDICINE

## 2024-04-25 PROCEDURE — 99214 OFFICE O/P EST MOD 30 MIN: CPT | Performed by: INTERNAL MEDICINE

## 2024-04-25 RX ORDER — LEVOTHYROXINE SODIUM 150 UG/1
150 TABLET ORAL DAILY
Qty: 90 TABLET | Refills: 1 | Status: SHIPPED | OUTPATIENT
Start: 2024-04-25 | End: 2025-04-25

## 2024-04-25 RX ORDER — BUPROPION HYDROCHLORIDE 150 MG/1
150 TABLET ORAL EVERY MORNING
Qty: 30 TABLET | Refills: 2 | Status: SHIPPED | OUTPATIENT
Start: 2024-04-25 | End: 2024-06-24

## 2024-04-25 ASSESSMENT — ENCOUNTER SYMPTOMS
FATIGUE: 0
BACK PAIN: 0
PALPITATIONS: 0
NAUSEA: 0
VOMITING: 0
SHORTNESS OF BREATH: 0
WHEEZING: 0
COUGH: 0
ABDOMINAL PAIN: 0
DIARRHEA: 0
ARTHRALGIAS: 0
BLOOD IN STOOL: 0

## 2024-04-25 NOTE — PATIENT INSTRUCTIONS
As we discussed we have changed the dose of your thyroid medication from 175 mcg daily down to 150 mcg daily.  Please remember to take this every day as directed and in approximately 2 months she will go back for thyroid blood test.  We will go over the results when you see you back  The staff will be calling you about scheduling an in-home sleep study and once the results are known I will contact you  The staff will also be helping to schedule your bone density as it is recommended for women in menopause.  This is a way to check for osteoporosis  Please remember to bring in a copy of your power of  for healthcare and living will  I sent a prescription to your pharmacy for medication called Wellbutrin to help you quit smoking and please take this once daily.  Please call me if you are having problems with the medication and you can safely take the nicotine cessation products at the same time  Only see you back in 2 months we will see how you are doing with the medication

## 2024-04-25 NOTE — ASSESSMENT & PLAN NOTE
-She has had some torn ligaments and she received a brace as well as cortisone injections by her orthopedist.  She is using Voltaren gel topically for pain

## 2024-04-25 NOTE — ASSESSMENT & PLAN NOTE
-We estimate that her sleep study was well over 2 years ago and we will order an in-home study  -I discussed with her in detail the pathophysiology of obstructive sleep apnea and how it can cause long-term health consequences.  I also gave her a handout on sleep apnea to read

## 2024-04-25 NOTE — ASSESSMENT & PLAN NOTE
-We will send her to a nutritionist as she has expressed interest in getting on a healthier diet and working towards weight loss

## 2024-04-25 NOTE — ASSESSMENT & PLAN NOTE
-She was seen and evaluated by Dr. Cruz and actually had a polypectomy of her vocal cords.  It was noncancerous

## 2024-04-25 NOTE — ASSESSMENT & PLAN NOTE
-We will try Wellbutrin daily  -She has not had any history of seizures  -We will continue to monitor very closely

## 2024-04-25 NOTE — PROGRESS NOTES
Subjective   Patient ID: Linda Lynn is a 63 y.o. female who presents for Follow-up.  HPI  She is here today for her follow-up and a lot has happened since we saw her 6 months ago.  She was seen by the orthopedic specialist for her right elbow and they discovered that she had some torn ligaments.  She was supplied with a brace and received cortisone injection.  She also has been using topical Voltaren which she states has been beneficial.  She states she is right-hand and because of her elbow pain she was unable to do the tasks at her workplace and ended up quitting her job.  She states she is doing a little bit better now and has applied for a new position.  She is hoping that it will go well with her work.  She also saw the ear nose and throat specialist for her hoarseness.  They discovered she had some polyps so she had them removed and thankfully they were noncancerous.  We talked about her thyroid condition and we did adjust her medication last time.  Her TSH is within acceptable range her free T4 is still high so I decided to make 1 additional adjustment and we will recheck her numbers in approximately 2 months.  We also reviewed laboratory test results and I am pleased that her hemoglobin A1c went down to 5.5.  Her kidney function also looks good as well as her electrolytes.  We also discussed the importance of quitting smoking and she does struggle.  She is willing to try Wellbutrin and I told her she could safely use the nicotine cessation products as well.  I have also recommended she might benefit from joining 1 800 quit now or the  smoking cessation program.  We also discussed the challenges with weight loss and I decided that she would benefit from seeing a nutritionist.  Also she is diagnosed as having obstructive sleep apnea and she states her sleep study was probably well over 2 years ago.  I do strongly suspect that she has significant sleep apnea and we talked about the pathophysiology of the  condition.  We discussed how it can lead to long-term problems.  She is agreeable to doing an in-home test and once results are known I will contact her.  We will also order her DEXA scan and I will see her back in approximately 2 months.  Review of Systems   Constitutional:  Negative for fatigue.   Respiratory:  Negative for cough, shortness of breath and wheezing.    Cardiovascular:  Negative for chest pain, palpitations and leg swelling.   Gastrointestinal:  Negative for abdominal pain, blood in stool, diarrhea, nausea and vomiting.   Musculoskeletal:  Negative for arthralgias and back pain.     Objective   Physical Exam  Vitals and nursing note reviewed.   Constitutional:       General: She is not in acute distress.     Appearance: Normal appearance.   HENT:      Head: Normocephalic and atraumatic.   Eyes:      Conjunctiva/sclera: Conjunctivae normal.   Cardiovascular:      Rate and Rhythm: Normal rate and regular rhythm.      Heart sounds: Normal heart sounds.   Pulmonary:      Effort: No respiratory distress.      Breath sounds: No wheezing.   Abdominal:      Palpations: Abdomen is soft.      Tenderness: There is no abdominal tenderness. There is no guarding.   Musculoskeletal:         General: No swelling. Normal range of motion.   Skin:     General: Skin is warm and dry.   Neurological:      General: No focal deficit present.      Mental Status: She is alert and oriented to person, place, and time.   Psychiatric:         Behavior: Behavior normal.       Assessment/Plan   Problem List Items Addressed This Visit             ICD-10-CM    Cigarette smoker - Primary F17.210     -We will try Wellbutrin daily  -She has not had any history of seizures  -We will continue to monitor very closely         Relevant Medications    buPROPion XL (Wellbutrin XL) 150 mg 24 hr tablet    Hoarseness of voice R49.0     -She was seen and evaluated by Dr. Cruz and actually had a polypectomy of her vocal cords.  It was  noncancerous         Gastroesophageal reflux disease without esophagitis K21.9     -She will continue to treat her acid reflux and we will continue to monitor closely         Lateral epicondylitis of right elbow M77.11     -She has had some torn ligaments and she received a brace as well as cortisone injections by her orthopedist.  She is using Voltaren gel topically for pain         Obesity, morbid (Multi) E66.01     -We will send her to a nutritionist as she has expressed interest in getting on a healthier diet and working towards weight loss         Relevant Orders    Referral to Nutrition Services    Obstructive sleep apnea G47.33     -We estimate that her sleep study was well over 2 years ago and we will order an in-home study  -I discussed with her in detail the pathophysiology of obstructive sleep apnea and how it can cause long-term health consequences.  I also gave her a handout on sleep apnea to read         Relevant Orders    Home sleep apnea test (HSAT)    Menopause Z78.0     -She is agreeable to scheduling a bone density         Relevant Orders    XR DEXA bone density     Other Visit Diagnoses         Codes    Acquired hypothyroidism     E03.9    Relevant Medications    levothyroxine (Synthroid, Levoxyl) 150 mcg tablet    Other Relevant Orders    TSH    T4, free               Alicia Wong, DO

## 2024-05-01 ENCOUNTER — HOSPITAL ENCOUNTER (OUTPATIENT)
Dept: RADIOLOGY | Facility: CLINIC | Age: 64
Discharge: HOME | End: 2024-05-01
Payer: MEDICARE

## 2024-05-01 ENCOUNTER — CLINICAL SUPPORT (OUTPATIENT)
Dept: SLEEP MEDICINE | Facility: HOSPITAL | Age: 64
End: 2024-05-01
Payer: MEDICARE

## 2024-05-01 VITALS — WEIGHT: 240 LBS | BODY MASS INDEX: 42.52 KG/M2 | HEIGHT: 63 IN

## 2024-05-01 DIAGNOSIS — G47.33 OBSTRUCTIVE SLEEP APNEA: ICD-10-CM

## 2024-05-01 DIAGNOSIS — Z78.0 MENOPAUSE: ICD-10-CM

## 2024-05-01 PROCEDURE — 77080 DXA BONE DENSITY AXIAL: CPT

## 2024-05-01 PROCEDURE — 9420000001 HC RT PATIENT EDUCATION 5 MIN

## 2024-05-01 PROCEDURE — 95806 SLEEP STUDY UNATT&RESP EFFT: CPT | Mod: 52 | Performed by: PSYCHIATRY & NEUROLOGY

## 2024-05-01 NOTE — PROGRESS NOTES
[facility] TECH NOTE:     Patient: Linda Lynn   MRN//AGE: [id number]  1960  63 y.o.   Technologist: Shirin Ortega RRT-SDS   Room: Home Sleep Study_Nomad   Service Date: 2024        Sleep Testing Location: [place] [address]      [place]: 3    TECHNOLOGIST SLEEP STUDY PROCEDURE NOTE:   This sleep study is being conducted according to the policies and procedures outlined by the AAS accreditation standards.  The sleep study procedure and processes involved during this appointment was explained to the patient/patient’s family, questions were answered. The patient/family verbalized understanding.      The patient is a 63 y.o. year old female scheduled for a Home Sleep Apnea Test.    The full study Was completed.  Patient questionnaires completed?: yes     Consents signed? yes    Initial Fall Risk Screening:     Linda has not fallen in the last 6 months. She did not result in injury. Linda does not have a fear of falling. She does not need assistance with sitting, standing, or walking. She does not need assistance walking in her home. She does not need assistance in an unfamiliar setting. The patient is not using an assistive device.     Brief Study observations:     Deviation to order/protocol and reason:      If PAP, which was preferred mask/pressure/mode:     Other:The patient received equipment and instructions for use of the [employer] Nomad HSAT device. The patient was instructed how to apply the effort belts, cannula, thermistor. It was also explained how the Nomad and oximeter components work.       The patient was informed of their responsibility for the device and acknowledged this by signing the HSAT device contract. The patient was asked to return the device on the next day and was shown where the drop off box was located.     After the procedure, the patient/family was informed to ensure followup with ordering clinician for testing results.      Technologist: Shirin VALDOVINOS  Jordan, RRT-SDS

## 2024-05-09 DIAGNOSIS — G47.33 MODERATE OBSTRUCTIVE SLEEP APNEA: Primary | ICD-10-CM

## 2024-05-09 NOTE — RESULT ENCOUNTER NOTE
I called her about her sleep study and explained that she has moderate obstructive sleep apnea and we should treat it based on her symptoms and comorbidities.  Her insurance covered an in-home sleep study but I am going to request an in lab CPAP titration to see if they will cover it.  I did explain to her everything and she will be expecting our call for scheduling.  Thank you

## 2024-05-31 DIAGNOSIS — J30.2 SEASONAL ALLERGIES: Primary | ICD-10-CM

## 2024-05-31 RX ORDER — MONTELUKAST SODIUM 10 MG/1
10 TABLET ORAL NIGHTLY
Qty: 30 TABLET | Refills: 5 | Status: SHIPPED | OUTPATIENT
Start: 2024-05-31 | End: 2024-11-27

## 2024-06-12 ENCOUNTER — APPOINTMENT (OUTPATIENT)
Dept: SLEEP MEDICINE | Facility: CLINIC | Age: 64
End: 2024-06-12
Payer: MEDICARE

## 2024-08-22 ENCOUNTER — APPOINTMENT (OUTPATIENT)
Dept: RADIOLOGY | Facility: HOSPITAL | Age: 64
End: 2024-08-22
Payer: MEDICARE

## 2024-08-22 DIAGNOSIS — Z12.31 SCREENING MAMMOGRAM FOR BREAST CANCER: ICD-10-CM

## 2024-10-17 DIAGNOSIS — E03.9 ACQUIRED HYPOTHYROIDISM: ICD-10-CM

## 2024-10-17 RX ORDER — LEVOTHYROXINE SODIUM 150 UG/1
150 TABLET ORAL DAILY
Qty: 90 TABLET | Refills: 0 | Status: SHIPPED | OUTPATIENT
Start: 2024-10-17

## 2024-10-24 ENCOUNTER — LAB (OUTPATIENT)
Dept: LAB | Facility: LAB | Age: 64
End: 2024-10-24
Payer: MEDICARE

## 2024-10-24 ENCOUNTER — HOSPITAL ENCOUNTER (OUTPATIENT)
Dept: RADIOLOGY | Facility: HOSPITAL | Age: 64
Discharge: HOME | End: 2024-10-24
Payer: MEDICARE

## 2024-10-24 DIAGNOSIS — E03.9 ACQUIRED HYPOTHYROIDISM: ICD-10-CM

## 2024-10-24 DIAGNOSIS — Z12.31 SCREENING MAMMOGRAM FOR BREAST CANCER: ICD-10-CM

## 2024-10-24 DIAGNOSIS — E04.1 THYROID NODULE: ICD-10-CM

## 2024-10-24 LAB
T4 FREE SERPL-MCNC: 0.73 NG/DL (ref 0.61–1.12)
TSH SERPL-ACNC: 12.89 MIU/L (ref 0.44–3.98)

## 2024-10-24 PROCEDURE — 84443 ASSAY THYROID STIM HORMONE: CPT

## 2024-10-24 PROCEDURE — 77067 SCR MAMMO BI INCL CAD: CPT

## 2024-10-24 PROCEDURE — 84439 ASSAY OF FREE THYROXINE: CPT

## 2024-10-24 PROCEDURE — 77067 SCR MAMMO BI INCL CAD: CPT | Performed by: RADIOLOGY

## 2024-10-24 PROCEDURE — 36415 COLL VENOUS BLD VENIPUNCTURE: CPT

## 2024-10-24 PROCEDURE — 76536 US EXAM OF HEAD AND NECK: CPT

## 2024-10-24 PROCEDURE — 77063 BREAST TOMOSYNTHESIS BI: CPT | Performed by: RADIOLOGY

## 2024-10-24 PROCEDURE — 76536 US EXAM OF HEAD AND NECK: CPT | Performed by: STUDENT IN AN ORGANIZED HEALTH CARE EDUCATION/TRAINING PROGRAM

## 2024-11-06 ENCOUNTER — APPOINTMENT (OUTPATIENT)
Age: 64
End: 2024-11-06
Payer: MEDICARE

## 2024-11-06 VITALS
WEIGHT: 234 LBS | HEART RATE: 64 BPM | OXYGEN SATURATION: 96 % | DIASTOLIC BLOOD PRESSURE: 72 MMHG | SYSTOLIC BLOOD PRESSURE: 118 MMHG | HEIGHT: 63 IN | BODY MASS INDEX: 41.46 KG/M2

## 2024-11-06 DIAGNOSIS — G47.33 MODERATE OBSTRUCTIVE SLEEP APNEA: ICD-10-CM

## 2024-11-06 DIAGNOSIS — M77.11 LATERAL EPICONDYLITIS OF RIGHT ELBOW: ICD-10-CM

## 2024-11-06 DIAGNOSIS — J30.2 SEASONAL ALLERGIES: ICD-10-CM

## 2024-11-06 DIAGNOSIS — K21.9 GASTROESOPHAGEAL REFLUX DISEASE WITHOUT ESOPHAGITIS: ICD-10-CM

## 2024-11-06 DIAGNOSIS — E03.9 ACQUIRED HYPOTHYROIDISM: ICD-10-CM

## 2024-11-06 DIAGNOSIS — R73.9 HYPERGLYCEMIA: ICD-10-CM

## 2024-11-06 DIAGNOSIS — E78.2 MIXED HYPERLIPIDEMIA: ICD-10-CM

## 2024-11-06 DIAGNOSIS — Z00.00 MEDICARE ANNUAL WELLNESS VISIT, SUBSEQUENT: ICD-10-CM

## 2024-11-06 DIAGNOSIS — F17.210 CIGARETTE SMOKER: ICD-10-CM

## 2024-11-06 DIAGNOSIS — R53.83 OTHER FATIGUE: Primary | ICD-10-CM

## 2024-11-06 PROBLEM — Z78.0 MENOPAUSE: Status: RESOLVED | Noted: 2023-10-26 | Resolved: 2024-11-06

## 2024-11-06 PROCEDURE — 3008F BODY MASS INDEX DOCD: CPT | Performed by: INTERNAL MEDICINE

## 2024-11-06 PROCEDURE — 1124F ACP DISCUSS-NO DSCNMKR DOCD: CPT | Performed by: INTERNAL MEDICINE

## 2024-11-06 PROCEDURE — G0008 ADMIN INFLUENZA VIRUS VAC: HCPCS | Performed by: INTERNAL MEDICINE

## 2024-11-06 PROCEDURE — 90673 RIV3 VACCINE NO PRESERV IM: CPT | Performed by: INTERNAL MEDICINE

## 2024-11-06 PROCEDURE — 1170F FXNL STATUS ASSESSED: CPT | Performed by: INTERNAL MEDICINE

## 2024-11-06 PROCEDURE — 1159F MED LIST DOCD IN RCRD: CPT | Performed by: INTERNAL MEDICINE

## 2024-11-06 PROCEDURE — G0439 PPPS, SUBSEQ VISIT: HCPCS | Performed by: INTERNAL MEDICINE

## 2024-11-06 PROCEDURE — 99214 OFFICE O/P EST MOD 30 MIN: CPT | Performed by: INTERNAL MEDICINE

## 2024-11-06 RX ORDER — MONTELUKAST SODIUM 10 MG/1
10 TABLET ORAL NIGHTLY
Qty: 90 TABLET | Refills: 1 | Status: SHIPPED | OUTPATIENT
Start: 2024-11-06 | End: 2025-05-05

## 2024-11-06 RX ORDER — FAMOTIDINE 40 MG/1
40 TABLET, FILM COATED ORAL DAILY
Qty: 90 TABLET | Refills: 1 | Status: SHIPPED | OUTPATIENT
Start: 2024-11-06

## 2024-11-06 RX ORDER — DICLOFENAC SODIUM 10 MG/G
2 GEL TOPICAL 4 TIMES DAILY PRN
Qty: 100 G | Refills: 1 | Status: SHIPPED | OUTPATIENT
Start: 2024-11-06

## 2024-11-06 RX ORDER — LEVOTHYROXINE SODIUM 175 UG/1
175 TABLET ORAL DAILY
Qty: 90 TABLET | Refills: 1 | Status: SHIPPED | OUTPATIENT
Start: 2024-11-06 | End: 2025-11-06

## 2024-11-06 ASSESSMENT — ENCOUNTER SYMPTOMS
DIARRHEA: 0
PALPITATIONS: 0
WHEEZING: 0
FATIGUE: 1
BLOOD IN STOOL: 0
ABDOMINAL PAIN: 0
ARTHRALGIAS: 0
SHORTNESS OF BREATH: 0
ROS GI COMMENTS: GERD
COUGH: 0
BACK PAIN: 0
NAUSEA: 0
VOMITING: 0

## 2024-11-06 ASSESSMENT — ACTIVITIES OF DAILY LIVING (ADL)
MANAGING_FINANCES: INDEPENDENT
GROCERY_SHOPPING: INDEPENDENT
BATHING: INDEPENDENT
DOING_HOUSEWORK: INDEPENDENT
DRESSING: INDEPENDENT
TAKING_MEDICATION: INDEPENDENT

## 2024-11-06 ASSESSMENT — PATIENT HEALTH QUESTIONNAIRE - PHQ9
1. LITTLE INTEREST OR PLEASURE IN DOING THINGS: NOT AT ALL
SUM OF ALL RESPONSES TO PHQ9 QUESTIONS 1 AND 2: 0
SUM OF ALL RESPONSES TO PHQ9 QUESTIONS 1 AND 2: 0
2. FEELING DOWN, DEPRESSED OR HOPELESS: NOT AT ALL
1. LITTLE INTEREST OR PLEASURE IN DOING THINGS: NOT AT ALL
2. FEELING DOWN, DEPRESSED OR HOPELESS: NOT AT ALL

## 2024-11-06 NOTE — ASSESSMENT & PLAN NOTE
Orders:    diclofenac sodium (Voltaren) 1 % gel; Apply 2.25 inches (2 g) topically 4 times a day as needed (as needed pain and/or swelling Right wrist and L middle finger).

## 2024-11-06 NOTE — ASSESSMENT & PLAN NOTE
-She actually never got the CPAP and we talked about pursuing treatment.  She states she will think about it and if she changes her mind she will get back with me

## 2024-11-06 NOTE — ASSESSMENT & PLAN NOTE
-We will check a fasting glucose and hemoglobin A1c just prior to her next follow-up visit    Orders:    Basic Metabolic Panel; Future    Hemoglobin A1C; Future

## 2024-11-06 NOTE — ASSESSMENT & PLAN NOTE
-We discussed fall prevention  -We discussed the importance of routine exercise  -We discussed providing a copy of advance directives

## 2024-11-06 NOTE — PATIENT INSTRUCTIONS
As we discussed I will change her medicine to the 175 mcg dose.  Please take this daily in isolation from food or supplements for 1 hour and in approximately 2 months go back to the lab for a thyroid blood test.  We will also check a CBC and I will contact you with the results  I do feel that part of your fatigue is from untreated sleep apnea.  If you change your mind about treatment please give me a call  I sent refills on all your medications and please let us know if there is any issue  The staff will be helping you to schedule your screening CT scan and please let us know if there is anything we can do to help you quit smoking  We will see you back in 6 months and please remember to go for fasting lab work prior to that visit  Please remember to complete your advance directives which include living will and power of  for healthcare and drop a copy off here

## 2024-11-06 NOTE — ASSESSMENT & PLAN NOTE
-She will continue with montelukast and Flonase nasal spray    Orders:    montelukast (Singulair) 10 mg tablet; Take 1 tablet (10 mg) by mouth once daily at bedtime.

## 2024-11-06 NOTE — ASSESSMENT & PLAN NOTE
-She reports that her reflux is under relatively good control with the famotidine 40 mg    Orders:    famotidine (Pepcid) 40 mg tablet; Take 1 tablet (40 mg) by mouth once daily.

## 2024-11-06 NOTE — ASSESSMENT & PLAN NOTE
-We are having her go back on the 170 mcg dose and she will go back to the lab in 2 months for another TSH.    Orders:    levothyroxine (Synthroid, Levoxyl) 175 mcg tablet; Take 1 tablet (175 mcg) by mouth early in the morning.. Take on an empty stomach at the same time each day, either 30 to 60 minutes prior to breakfast    TSH; Future

## 2024-11-06 NOTE — ASSESSMENT & PLAN NOTE
-We talked about quitting smoking and we are scheduling a CT scan    Orders:    CT lung screening low dose; Future

## 2024-11-06 NOTE — PROGRESS NOTES
Subjective   Reason for Visit: Linda Lynn is an 64 y.o. female here for a Medicare Wellness visit.     Reviewed all medications by prescribing practitioner or clinical pharmacist (such as prescriptions, OTCs, herbal therapies and supplements) and documented in the medical record.    HPI  She is here today for a routine checkup and we also took this opportunity to complete her annual Medicare wellness visit.  She denies any symptoms of depression at this time.  She has had no falls in the last year and she has taken measures to fall proof her home.  In fact she has specific training on fall prevention.  She also tries to maintain an active lifestyle and does a lot of walking at work.  We remind her to try to get 30 minutes of aerobic activity 5 days a week.  She also tries to eat a healthy diet.  She also appears to have good memory and is highly independent doing everything for herself including managing her own finances as well as her own medications.  She states that the allergies have been particularly challenging this year but she is taking the Singulair which has helped.  We also discussed the importance of establishing advanced directives including living will and power of  for healthcare.   She does complain of fatigue but we are reminded that she suffers from obstructive sleep apnea and she has not been using her Cpap.  I do encourage her to try to get back on that.  We reviewed her most recent laboratory test results and her TSH is elevated indicating that she is not getting enough supplement.  She states that for approximately 2 months she has been taking 150 mcg alternating with 175 mcg.  We will put her on the 175 and in 2 months she will go back for a TSH and have agreed to contact her with results.  She also continues to smoke but she has been trying to cut back.  We discussed the extreme importance of quitting smoking.  We also discussed cancer screening and she is due for the CT scan.  We  "talked about her acid reflux.  We are providing refills on medications today and if everything goes according to plan we will see her back in 6 months for another checkup.  Patient Care Team:  Alicia Wong DO as PCP - General (Internal Medicine)  Alicia Wong DO as PCP - [facility] [employer] PCP     Review of Systems   Constitutional:  Positive for fatigue.   Respiratory:  Negative for cough, shortness of breath and wheezing.    Cardiovascular:  Negative for chest pain, palpitations and leg swelling.   Gastrointestinal:  Negative for abdominal pain, blood in stool, diarrhea, nausea and vomiting.        GERD   Musculoskeletal:  Negative for arthralgias and back pain.       Objective   Vitals:  /72   Pulse 64   Ht 1.6 m (5' 3\")   Wt 106 kg (234 lb)   SpO2 96%   BMI 41.45 kg/m²       Physical Exam  Vitals and nursing note reviewed.   Constitutional:       General: She is not in acute distress.     Appearance: Normal appearance.   HENT:      Head: Normocephalic and atraumatic.   Eyes:      Conjunctiva/sclera: Conjunctivae normal.   Cardiovascular:      Rate and Rhythm: Normal rate and regular rhythm.      Heart sounds: Normal heart sounds.   Pulmonary:      Effort: No respiratory distress.      Breath sounds: No wheezing.   Abdominal:      Palpations: Abdomen is soft.      Tenderness: There is no abdominal tenderness. There is no guarding.   Musculoskeletal:         General: No swelling. Normal range of motion.   Skin:     General: Skin is warm and dry.   Neurological:      General: No focal deficit present.      Mental Status: She is alert and oriented to person, place, and time.   Psychiatric:         Behavior: Behavior normal.       Recent Results (from the past 4 weeks)   TSH    Collection Time: 10/24/24  8:14 AM   Result Value Ref Range    Thyroid Stimulating Hormone 12.89 (H) 0.44 - 3.98 mIU/L   T4, free    Collection Time: 10/24/24  8:14 AM   Result Value Ref Range    Thyroxine, Free " 0.73 0.61 - 1.12 ng/dL       Assessment & Plan  Other fatigue    Orders:    CBC and Auto Differential; Future    Acquired hypothyroidism  -We are having her go back on the 170 mcg dose and she will go back to the lab in 2 months for another TSH.    Orders:    levothyroxine (Synthroid, Levoxyl) 175 mcg tablet; Take 1 tablet (175 mcg) by mouth early in the morning.. Take on an empty stomach at the same time each day, either 30 to 60 minutes prior to breakfast    TSH; Future    Lateral epicondylitis of right elbow    Orders:    diclofenac sodium (Voltaren) 1 % gel; Apply 2.25 inches (2 g) topically 4 times a day as needed (as needed pain and/or swelling Right wrist and L middle finger).    Seasonal allergies  -She will continue with montelukast and Flonase nasal spray    Orders:    montelukast (Singulair) 10 mg tablet; Take 1 tablet (10 mg) by mouth once daily at bedtime.    Hyperglycemia  -We will check a fasting glucose and hemoglobin A1c just prior to her next follow-up visit    Orders:    Basic Metabolic Panel; Future    Hemoglobin A1C; Future    Moderate obstructive sleep apnea  -She actually never got the CPAP and we talked about pursuing treatment.  She states she will think about it and if she changes her mind she will get back with me         Cigarette smoker  -We talked about quitting smoking and we are scheduling a CT scan    Orders:    CT lung screening low dose; Future    Gastroesophageal reflux disease without esophagitis  -She reports that her reflux is under relatively good control with the famotidine 40 mg    Orders:    famotidine (Pepcid) 40 mg tablet; Take 1 tablet (40 mg) by mouth once daily.    Mixed hyperlipidemia    Orders:    Lipid Panel; Future    Medicare annual wellness visit, subsequent  -We discussed fall prevention  -We discussed the importance of routine exercise  -We discussed providing a copy of advance directives       Patient instructions  As we discussed I will change her medicine to  the 175 mcg dose.  Please take this daily in isolation from food or supplements for 1 hour and in approximately 2 months go back to the lab for a thyroid blood test.  We will also check a CBC and I will contact you with the results  I do feel that part of your fatigue is from untreated sleep apnea.  If you change your mind about treatment please give me a call  I sent refills on all your medications and please let us know if there is any issue  The staff will be helping you to schedule your screening CT scan and please let us know if there is anything we can do to help you quit smoking  We will see you back in 6 months and please remember to go for fasting lab work prior to that visit.Please remember to complete your advance directives which include living will and power of  for healthcare and drop a copy off here.

## 2024-11-19 ENCOUNTER — HOSPITAL ENCOUNTER (OUTPATIENT)
Dept: RADIOLOGY | Facility: HOSPITAL | Age: 64
Discharge: HOME | End: 2024-11-19
Payer: MEDICARE

## 2024-11-19 DIAGNOSIS — F17.210 CIGARETTE SMOKER: ICD-10-CM

## 2024-11-19 PROCEDURE — 71271 CT THORAX LUNG CANCER SCR C-: CPT

## 2024-11-19 PROCEDURE — 71271 CT THORAX LUNG CANCER SCR C-: CPT | Performed by: RADIOLOGY

## 2025-05-02 LAB
ANION GAP SERPL CALCULATED.4IONS-SCNC: 11 MMOL/L (CALC) (ref 7–17)
BASOPHILS # BLD AUTO: 80 CELLS/UL (ref 0–200)
BASOPHILS NFR BLD AUTO: 1 %
BUN SERPL-MCNC: 11 MG/DL (ref 7–25)
BUN/CREAT SERPL: NORMAL (CALC) (ref 6–22)
CALCIUM SERPL-MCNC: 9.6 MG/DL (ref 8.6–10.4)
CHLORIDE SERPL-SCNC: 101 MMOL/L (ref 98–110)
CHOLEST SERPL-MCNC: 186 MG/DL
CHOLEST/HDLC SERPL: 3.8 (CALC)
CO2 SERPL-SCNC: 25 MMOL/L (ref 20–32)
CREAT SERPL-MCNC: 0.83 MG/DL (ref 0.5–1.05)
EGFRCR SERPLBLD CKD-EPI 2021: 78 ML/MIN/1.73M2
EOSINOPHIL # BLD AUTO: 328 CELLS/UL (ref 15–500)
EOSINOPHIL NFR BLD AUTO: 4.1 %
ERYTHROCYTE [DISTWIDTH] IN BLOOD BY AUTOMATED COUNT: 12.9 % (ref 11–15)
EST. AVERAGE GLUCOSE BLD GHB EST-MCNC: 117 MG/DL
EST. AVERAGE GLUCOSE BLD GHB EST-SCNC: 6.5 MMOL/L
GLUCOSE SERPL-MCNC: 92 MG/DL (ref 65–99)
HBA1C MFR BLD: 5.7 %
HCT VFR BLD AUTO: 45.7 % (ref 35–45)
HDLC SERPL-MCNC: 49 MG/DL
HGB BLD-MCNC: 15.2 G/DL (ref 11.7–15.5)
LDLC SERPL CALC-MCNC: 113 MG/DL (CALC)
LYMPHOCYTES # BLD AUTO: 2224 CELLS/UL (ref 850–3900)
LYMPHOCYTES NFR BLD AUTO: 27.8 %
MCH RBC QN AUTO: 31.7 PG (ref 27–33)
MCHC RBC AUTO-ENTMCNC: 33.3 G/DL (ref 32–36)
MCV RBC AUTO: 95.2 FL (ref 80–100)
MONOCYTES # BLD AUTO: 456 CELLS/UL (ref 200–950)
MONOCYTES NFR BLD AUTO: 5.7 %
NEUTROPHILS # BLD AUTO: 4912 CELLS/UL (ref 1500–7800)
NEUTROPHILS NFR BLD AUTO: 61.4 %
NONHDLC SERPL-MCNC: 137 MG/DL (CALC)
PLATELET # BLD AUTO: 273 THOUSAND/UL (ref 140–400)
PMV BLD REES-ECKER: 11 FL (ref 7.5–12.5)
POTASSIUM SERPL-SCNC: 4.8 MMOL/L (ref 3.5–5.3)
RBC # BLD AUTO: 4.8 MILLION/UL (ref 3.8–5.1)
SODIUM SERPL-SCNC: 137 MMOL/L (ref 135–146)
TRIGL SERPL-MCNC: 128 MG/DL
TSH SERPL-ACNC: 1.04 MIU/L (ref 0.4–4.5)
WBC # BLD AUTO: 8 THOUSAND/UL (ref 3.8–10.8)

## 2025-05-06 ENCOUNTER — APPOINTMENT (OUTPATIENT)
Age: 65
End: 2025-05-06
Payer: MEDICARE

## 2025-05-06 VITALS
BODY MASS INDEX: 40.96 KG/M2 | HEART RATE: 66 BPM | HEIGHT: 63 IN | OXYGEN SATURATION: 96 % | DIASTOLIC BLOOD PRESSURE: 78 MMHG | SYSTOLIC BLOOD PRESSURE: 134 MMHG | WEIGHT: 231.2 LBS

## 2025-05-06 DIAGNOSIS — J30.2 SEASONAL ALLERGIES: ICD-10-CM

## 2025-05-06 DIAGNOSIS — E03.9 ACQUIRED HYPOTHYROIDISM: ICD-10-CM

## 2025-05-06 DIAGNOSIS — E78.2 MIXED HYPERLIPIDEMIA: ICD-10-CM

## 2025-05-06 DIAGNOSIS — F17.210 CIGARETTE SMOKER: Primary | ICD-10-CM

## 2025-05-06 DIAGNOSIS — G47.33 MODERATE OBSTRUCTIVE SLEEP APNEA: ICD-10-CM

## 2025-05-06 DIAGNOSIS — R73.9 HYPERGLYCEMIA: ICD-10-CM

## 2025-05-06 PROBLEM — K21.9 GASTROESOPHAGEAL REFLUX DISEASE WITHOUT ESOPHAGITIS: Status: RESOLVED | Noted: 2023-08-31 | Resolved: 2025-05-06

## 2025-05-06 PROBLEM — M77.11 LATERAL EPICONDYLITIS OF RIGHT ELBOW: Status: RESOLVED | Noted: 2023-08-31 | Resolved: 2025-05-06

## 2025-05-06 PROBLEM — R53.83 OTHER FATIGUE: Status: RESOLVED | Noted: 2023-08-31 | Resolved: 2025-05-06

## 2025-05-06 PROBLEM — Z00.00 MEDICARE ANNUAL WELLNESS VISIT, SUBSEQUENT: Status: RESOLVED | Noted: 2023-08-31 | Resolved: 2025-05-06

## 2025-05-06 PROBLEM — E66.01 OBESITY, MORBID (MULTI): Status: RESOLVED | Noted: 2023-08-31 | Resolved: 2025-05-06

## 2025-05-06 PROCEDURE — 99214 OFFICE O/P EST MOD 30 MIN: CPT | Performed by: INTERNAL MEDICINE

## 2025-05-06 PROCEDURE — 1160F RVW MEDS BY RX/DR IN RCRD: CPT | Performed by: INTERNAL MEDICINE

## 2025-05-06 PROCEDURE — 3008F BODY MASS INDEX DOCD: CPT | Performed by: INTERNAL MEDICINE

## 2025-05-06 PROCEDURE — G2211 COMPLEX E/M VISIT ADD ON: HCPCS | Performed by: INTERNAL MEDICINE

## 2025-05-06 PROCEDURE — 1159F MED LIST DOCD IN RCRD: CPT | Performed by: INTERNAL MEDICINE

## 2025-05-06 RX ORDER — LEVOTHYROXINE SODIUM 175 UG/1
175 TABLET ORAL DAILY
Qty: 100 TABLET | Refills: 1 | Status: SHIPPED | OUTPATIENT
Start: 2025-05-06 | End: 2026-05-06

## 2025-05-06 RX ORDER — MONTELUKAST SODIUM 10 MG/1
10 TABLET ORAL NIGHTLY
Qty: 100 TABLET | Refills: 1 | Status: SHIPPED | OUTPATIENT
Start: 2025-05-06 | End: 2025-11-02

## 2025-05-06 ASSESSMENT — ENCOUNTER SYMPTOMS
FATIGUE: 1
ARTHRALGIAS: 0
CHEST TIGHTNESS: 0
VOMITING: 0
SHORTNESS OF BREATH: 0
ABDOMINAL PAIN: 0
UNEXPECTED WEIGHT CHANGE: 0
DIARRHEA: 0
BLOOD IN STOOL: 0
PALPITATIONS: 0
COUGH: 0
NAUSEA: 0
WHEEZING: 0
BACK PAIN: 0

## 2025-05-06 NOTE — ASSESSMENT & PLAN NOTE
-We discussed the importance of treating sleep apnea and I do believe that this is contributing to her chronic fatigue.-She will call if she changes her mind about treatment

## 2025-05-06 NOTE — PROGRESS NOTES
Subjective   Patient ID: Linda Lynn is a 64 y.o. female who presents for Follow-up (6 MO CK).  HPI  She is here today for her routine 6-month checkup.  Her blood pressure is within acceptable range.  We also see that she has lost 9 pounds over the past year.  She explains that back at the beginning of the year she took on a job as a  at a veterans [employer] and she states she is enjoying the interaction.  She is moving more and we feel that this is the force behind her weight loss.  We encouraged her to keep up the great work.  We also discussed her smoking and she does continue to use tobacco.  Sometime ago we gave her a prescription for Wellbutrin and she states she has been thinking about starting that medication.  She is pretty sure she has the medicine at home and we will check the expiration date.  She will call us to let us know if it is still viable and that she is starting it.  We can also give her a new prescription if necessary.  We have discussed the extreme importance of quitting smoking.  We also will be arranging for her CT screen when she comes back next time in 6 months.  We also reviewed her laboratory test results.  Her hemoglobin A1c was slightly raised at 5.7.  Her cholesterol overall was satisfactory although could be a little bit better.  Her thyroid blood test was perfect and we will check this again in 1 year.  We are providing refills on medicines.  If everything goes according to plan we will see her back in 6 months with repeat laboratory.  Review of Systems   Constitutional:  Positive for fatigue. Negative for unexpected weight change.   Respiratory:  Negative for cough, chest tightness, shortness of breath and wheezing.    Cardiovascular:  Negative for chest pain, palpitations and leg swelling.   Gastrointestinal:  Negative for abdominal pain, blood in stool, diarrhea, nausea and vomiting.   Musculoskeletal:  Negative for arthralgias and back pain.     Objective   Physical  Exam  Vitals and nursing note reviewed.   Constitutional:       General: She is not in acute distress.     Appearance: Normal appearance.   HENT:      Head: Normocephalic and atraumatic.   Eyes:      Conjunctiva/sclera: Conjunctivae normal.   Cardiovascular:      Rate and Rhythm: Normal rate and regular rhythm.      Heart sounds: Normal heart sounds.   Pulmonary:      Effort: No respiratory distress.      Breath sounds: No wheezing.   Abdominal:      Palpations: Abdomen is soft.      Tenderness: There is no abdominal tenderness. There is no guarding.   Musculoskeletal:         General: No swelling. Normal range of motion.   Skin:     General: Skin is warm and dry.   Neurological:      General: No focal deficit present.      Mental Status: She is alert and oriented to person, place, and time.   Psychiatric:         Behavior: Behavior normal.       Recent Results (from the past 5 weeks)   Basic Metabolic Panel    Collection Time: 05/01/25  9:32 AM   Result Value Ref Range    GLUCOSE 92 65 - 99 mg/dL    UREA NITROGEN (BUN) 11 7 - 25 mg/dL    CREATININE 0.83 0.50 - 1.05 mg/dL    EGFR 78 > OR = 60 mL/min/1.73m2    BUN/CREATININE RATIO SEE NOTE: 6 - 22 (calc)    SODIUM 137 135 - 146 mmol/L    POTASSIUM 4.8 3.5 - 5.3 mmol/L    CHLORIDE 101 98 - 110 mmol/L    CARBON DIOXIDE 25 20 - 32 mmol/L    ELECTROLYTE BALANCE 11 7 - 17 mmol/L (calc)    CALCIUM 9.6 8.6 - 10.4 mg/dL   CBC and Auto Differential    Collection Time: 05/01/25  9:32 AM   Result Value Ref Range    WHITE BLOOD CELL COUNT 8.0 3.8 - 10.8 Thousand/uL    RED BLOOD CELL COUNT 4.80 3.80 - 5.10 Million/uL    HEMOGLOBIN 15.2 11.7 - 15.5 g/dL    HEMATOCRIT 45.7 (H) 35.0 - 45.0 %    MCV 95.2 80.0 - 100.0 fL    MCH 31.7 27.0 - 33.0 pg    MCHC 33.3 32.0 - 36.0 g/dL    RDW 12.9 11.0 - 15.0 %    PLATELET COUNT 273 140 - 400 Thousand/uL    MPV 11.0 7.5 - 12.5 fL    ABSOLUTE NEUTROPHILS 4,912 1,500 - 7,800 cells/uL    ABSOLUTE LYMPHOCYTES 2,224 850 - 3,900 cells/uL    ABSOLUTE  MONOCYTES 456 200 - 950 cells/uL    ABSOLUTE EOSINOPHILS 328 15 - 500 cells/uL    ABSOLUTE BASOPHILS 80 0 - 200 cells/uL    NEUTROPHILS 61.4 %    LYMPHOCYTES 27.8 %    MONOCYTES 5.7 %    EOSINOPHILS 4.1 %    BASOPHILS 1.0 %   Thyroid Stimulating Hormone    Collection Time: 05/01/25  9:32 AM   Result Value Ref Range    TSH 1.04 0.40 - 4.50 mIU/L   Lipid Panel    Collection Time: 05/01/25  9:34 AM   Result Value Ref Range    CHOLESTEROL, TOTAL 186 <200 mg/dL    HDL CHOLESTEROL 49 (L) > OR = 50 mg/dL    TRIGLYCERIDES 128 <150 mg/dL    LDL-CHOLESTEROL 113 (H) mg/dL (calc)    CHOL/HDLC RATIO 3.8 <5.0 (calc)    NON HDL CHOLESTEROL 137 (H) <130 mg/dL (calc)   Hemoglobin A1C    Collection Time: 05/01/25  9:34 AM   Result Value Ref Range    HEMOGLOBIN A1c 5.7 (H) <5.7 %    eAG (mg/dL) 117 mg/dL    eAG (mmol/L) 6.5 mmol/L       Assessment/Plan   Problem List Items Addressed This Visit           ICD-10-CM    Cigarette smoker - Primary F17.210    - We discussed the importance of quitting smoking and she states she might get on the Wellbutrin.  She will call if her prescription at home is viable and if she is starting it.  We can also send her new prescription of course         Relevant Orders    CT lung screening low dose    Hyperglycemia R73.9    - Her hemoglobin A1c was borderline at 5.7 and we will continue to monitor         Relevant Orders    Basic Metabolic Panel    Hemoglobin A1C    Moderate obstructive sleep apnea G47.33    -We discussed the importance of treating sleep apnea and I do believe that this is contributing to her chronic fatigue.-She will call if she changes her mind about treatment         Acquired hypothyroidism E03.9    - Her TSH came back perfect this time and we will check it again in 1 year         Relevant Medications    levothyroxine (Synthroid, Levoxyl) 175 mcg tablet    Seasonal allergies J30.2    - She is using the Singulair which has been helpful         Relevant Medications    montelukast  (Singulair) 10 mg tablet    Mixed hyperlipidemia E78.2    - We discussed her cholesterol and we encouraged her to watch her diet closely and maintain an active lifestyle  -We will check her cholesterol again in 1 year         Body mass index (BMI) 40.0-44.9, adult (Multi) Z68.41    - I am extremely pleased to see that she has lost 9 pounds and I encouraged her to keep up the great work        Patient instructions  As we discussed we are scheduling to see you back in 6 months and the staff will be calling you about scheduling a screening CT scan to be done just prior to your next visit.  Please also remember to go for fasting lab work  Please check at home to see if you still have the prescription for the Wellbutrin.  If it is  we certainly can give you a different or new prescription.  Please let us know if you start this medicine as we will put it in your medication list.  Please also remember that you can use the nicotine cessation products such as gum or patches with this medicine.  .       Alicia Wong, DO

## 2025-05-06 NOTE — ASSESSMENT & PLAN NOTE
- We discussed her cholesterol and we encouraged her to watch her diet closely and maintain an active lifestyle  -We will check her cholesterol again in 1 year

## 2025-05-06 NOTE — PATIENT INSTRUCTIONS
Patient instructions  As we discussed we are scheduling to see you back in 6 months and the staff will be calling you about scheduling a screening CT scan to be done just prior to your next visit.  Please also remember to go for fasting lab work  Please check at home to see if you still have the prescription for the Wellbutrin.  If it is  we certainly can give you a different or new prescription.  Please let us know if you start this medicine as we will put it in your medication list.  Please also remember that you can use the nicotine cessation products such as gum or patches with this medicine.

## 2025-05-06 NOTE — ASSESSMENT & PLAN NOTE
- We discussed the importance of quitting smoking and she states she might get on the Wellbutrin.  She will call if her prescription at home is viable and if she is starting it.  We can also send her new prescription of course

## 2025-05-06 NOTE — ASSESSMENT & PLAN NOTE
- I am extremely pleased to see that she has lost 9 pounds and I encouraged her to keep up the great work

## 2025-11-18 ENCOUNTER — APPOINTMENT (OUTPATIENT)
Age: 65
End: 2025-11-18
Payer: MEDICARE

## 2025-11-24 ENCOUNTER — APPOINTMENT (OUTPATIENT)
Age: 65
End: 2025-11-24
Payer: MEDICARE